# Patient Record
Sex: FEMALE | Race: WHITE | NOT HISPANIC OR LATINO | Employment: OTHER | URBAN - METROPOLITAN AREA
[De-identification: names, ages, dates, MRNs, and addresses within clinical notes are randomized per-mention and may not be internally consistent; named-entity substitution may affect disease eponyms.]

---

## 2017-07-13 PROBLEM — Z96.1: Noted: 2017-07-13

## 2018-01-08 ENCOUNTER — NEW PATIENT COMPREHENSIVE (OUTPATIENT)
Dept: URBAN - METROPOLITAN AREA CLINIC 43 | Facility: CLINIC | Age: 73
End: 2018-01-08

## 2018-01-08 VITALS
HEART RATE: 66 BPM | HEIGHT: 55 IN | RESPIRATION RATE: 14 BRPM | DIASTOLIC BLOOD PRESSURE: 86 MMHG | SYSTOLIC BLOOD PRESSURE: 155 MMHG

## 2018-01-08 DIAGNOSIS — H26.492: ICD-10-CM

## 2018-01-08 DIAGNOSIS — H40.013: ICD-10-CM

## 2018-01-08 DIAGNOSIS — H43.811: ICD-10-CM

## 2018-01-08 DIAGNOSIS — H25.811: ICD-10-CM

## 2018-01-08 PROCEDURE — 92134 CPTRZ OPH DX IMG PST SGM RTA: CPT

## 2018-01-08 PROCEDURE — 92015 DETERMINE REFRACTIVE STATE: CPT

## 2018-01-08 PROCEDURE — 92136TC INTERFEROMETRY - TECHNICAL COMPONENT

## 2018-01-08 PROCEDURE — G8418 CALC BMI BLW LOW PARAM F/U: HCPCS

## 2018-01-08 PROCEDURE — 99204 OFFICE O/P NEW MOD 45 MIN: CPT

## 2018-01-08 PROCEDURE — 4040F PNEUMOC VAC/ADMIN/RCVD: CPT

## 2018-01-08 PROCEDURE — G8952 PRE-HTN/HTN, NO F/U, NOT GVN: HCPCS

## 2018-01-08 PROCEDURE — 1036F TOBACCO NON-USER: CPT

## 2018-01-08 PROCEDURE — 92025-2 CORNEAL TOPOGRAPHY, PT

## 2018-01-08 PROCEDURE — G8427 DOCREV CUR MEDS BY ELIG CLIN: HCPCS

## 2018-01-08 RX ORDER — DUREZOL 0.5 MG/ML: 1 EMULSION OPHTHALMIC TWICE A DAY

## 2018-01-08 RX ORDER — NEPAFENAC 3 MG/ML: 1 SUSPENSION/ DROPS OPHTHALMIC ONCE A DAY

## 2018-01-08 RX ORDER — TIMOLOL MALEATE 6.8 MG/ML: 1 SOLUTION OPHTHALMIC ONCE A DAY

## 2018-01-08 RX ORDER — MOXIFLOXACIN HYDROCHLORIDE 5 MG/ML: 1 SOLUTION/ DROPS OPHTHALMIC

## 2018-01-08 ASSESSMENT — VISUAL ACUITY
OS_SC: J4
OD_SC: J10
OD_SC: 20/40-2
OD_GLARE: 20/70
OS_SC: 20/30-1

## 2018-01-08 ASSESSMENT — TONOMETRY
OS_IOP_MMHG: 17
OD_IOP_MMHG: 21
OD_IOP_MMHG: 22

## 2018-01-18 ENCOUNTER — SURGERY/PROCEDURE (OUTPATIENT)
Dept: URBAN - METROPOLITAN AREA SURGERY 14 | Facility: SURGERY | Age: 73
End: 2018-01-18

## 2018-01-18 DIAGNOSIS — H26.492: ICD-10-CM

## 2018-01-18 PROCEDURE — 66821 AFTER CATARACT LASER SURGERY: CPT

## 2018-01-19 ENCOUNTER — POST-OP (OUTPATIENT)
Dept: URBAN - METROPOLITAN AREA CLINIC 43 | Facility: CLINIC | Age: 73
End: 2018-01-19

## 2018-01-19 DIAGNOSIS — Z98.890: ICD-10-CM

## 2018-01-19 PROCEDURE — 99024 POSTOP FOLLOW-UP VISIT: CPT

## 2018-01-19 ASSESSMENT — VISUAL ACUITY
OS_SC: 20/30+2
OD_SC: 20/40

## 2018-01-19 ASSESSMENT — TONOMETRY
OD_IOP_MMHG: 21
OS_IOP_MMHG: 21

## 2018-05-29 ENCOUNTER — CONSULT (OUTPATIENT)
Dept: URBAN - METROPOLITAN AREA CLINIC 43 | Facility: CLINIC | Age: 73
End: 2018-05-29

## 2018-05-29 DIAGNOSIS — H25.811: ICD-10-CM

## 2018-05-29 DIAGNOSIS — H43.811: ICD-10-CM

## 2018-05-29 DIAGNOSIS — H40.1134: ICD-10-CM

## 2018-05-29 PROCEDURE — 1036F TOBACCO NON-USER: CPT

## 2018-05-29 PROCEDURE — 2027F OPTIC NERVE HEAD EVAL DONE: CPT

## 2018-05-29 PROCEDURE — G8427 DOCREV CUR MEDS BY ELIG CLIN: HCPCS

## 2018-05-29 PROCEDURE — 92014 COMPRE OPH EXAM EST PT 1/>: CPT

## 2018-05-29 PROCEDURE — 3284F IOP RED >=15% PRE-NTRV LVL: CPT

## 2018-05-29 PROCEDURE — 76514 ECHO EXAM OF EYE THICKNESS: CPT

## 2018-05-29 PROCEDURE — 9222550 BILAT EXTENDED OPHTHALMOSCOPY, FIRST

## 2018-05-29 PROCEDURE — G8785 BP SCRN NO PERF AT INTERVAL: HCPCS

## 2018-05-29 PROCEDURE — 92020 GONIOSCOPY: CPT

## 2018-05-29 ASSESSMENT — PACHYMETRY
OD_CT_UM: 527
OS_CT_UM: 532

## 2018-05-29 ASSESSMENT — VISUAL ACUITY
OD_SC: 20/40-2
OD_SC: J10
OS_SC: J3
OS_SC: 20/25-1

## 2018-05-29 ASSESSMENT — TONOMETRY
OS_IOP_MMHG: 18
OD_IOP_MMHG: 16

## 2018-07-05 ENCOUNTER — IOP CHECK (OUTPATIENT)
Dept: URBAN - METROPOLITAN AREA CLINIC 43 | Facility: CLINIC | Age: 73
End: 2018-07-05

## 2018-07-05 DIAGNOSIS — H40.1113: ICD-10-CM

## 2018-07-05 DIAGNOSIS — H40.1122: ICD-10-CM

## 2018-07-05 PROCEDURE — 1036F TOBACCO NON-USER: CPT

## 2018-07-05 PROCEDURE — 2027F OPTIC NERVE HEAD EVAL DONE: CPT

## 2018-07-05 PROCEDURE — G8427 DOCREV CUR MEDS BY ELIG CLIN: HCPCS

## 2018-07-05 PROCEDURE — 0517F GLAUCOMA PLAN OF CARE DOCD: CPT

## 2018-07-05 PROCEDURE — 3285F IOP DOWN <15% OF PRE-SVC LVL: CPT

## 2018-07-05 PROCEDURE — G8785 BP SCRN NO PERF AT INTERVAL: HCPCS

## 2018-07-05 PROCEDURE — 92012 INTRM OPH EXAM EST PATIENT: CPT

## 2018-07-05 PROCEDURE — 92083 EXTENDED VISUAL FIELD XM: CPT

## 2018-07-05 ASSESSMENT — TONOMETRY
OS_IOP_MMHG: 17
OD_IOP_MMHG: 16

## 2018-07-05 ASSESSMENT — VISUAL ACUITY
OS_SC: 20/25-2
OD_SC: 20/40+2

## 2018-08-02 ENCOUNTER — IOP CHECK (OUTPATIENT)
Dept: URBAN - METROPOLITAN AREA CLINIC 43 | Facility: CLINIC | Age: 73
End: 2018-08-02

## 2018-08-02 DIAGNOSIS — H40.1122: ICD-10-CM

## 2018-08-02 DIAGNOSIS — H40.1113: ICD-10-CM

## 2018-08-02 PROCEDURE — 3285F IOP DOWN <15% OF PRE-SVC LVL: CPT

## 2018-08-02 PROCEDURE — 0517F GLAUCOMA PLAN OF CARE DOCD: CPT

## 2018-08-02 PROCEDURE — G8428 CUR MEDS NOT DOCUMENT: HCPCS

## 2018-08-02 PROCEDURE — 1036F TOBACCO NON-USER: CPT

## 2018-08-02 PROCEDURE — G8785 BP SCRN NO PERF AT INTERVAL: HCPCS

## 2018-08-02 PROCEDURE — 2027F OPTIC NERVE HEAD EVAL DONE: CPT

## 2018-08-02 PROCEDURE — 92012 INTRM OPH EXAM EST PATIENT: CPT

## 2018-08-02 ASSESSMENT — VISUAL ACUITY
OD_SC: 20/40-3
OS_SC: 20/30-2

## 2018-08-02 ASSESSMENT — TONOMETRY
OS_IOP_MMHG: 12
OD_IOP_MMHG: 13

## 2018-11-15 ENCOUNTER — IOP CHECK (OUTPATIENT)
Dept: URBAN - METROPOLITAN AREA CLINIC 43 | Facility: CLINIC | Age: 73
End: 2018-11-15

## 2018-11-15 DIAGNOSIS — H40.1122: ICD-10-CM

## 2018-11-15 DIAGNOSIS — H40.1113: ICD-10-CM

## 2018-11-15 PROCEDURE — G8428 CUR MEDS NOT DOCUMENT: HCPCS

## 2018-11-15 PROCEDURE — 1036F TOBACCO NON-USER: CPT

## 2018-11-15 PROCEDURE — G8785 BP SCRN NO PERF AT INTERVAL: HCPCS

## 2018-11-15 PROCEDURE — 3284F IOP RED >=15% PRE-NTRV LVL: CPT

## 2018-11-15 PROCEDURE — 92012 INTRM OPH EXAM EST PATIENT: CPT

## 2018-11-15 PROCEDURE — 2027F OPTIC NERVE HEAD EVAL DONE: CPT

## 2018-11-15 PROCEDURE — G9903 PT SCRN TBCO ID AS NON USER: HCPCS

## 2018-11-15 ASSESSMENT — VISUAL ACUITY
OD_SC: 20/40-2
OS_SC: 20/30

## 2018-11-15 ASSESSMENT — TONOMETRY
OD_IOP_MMHG: 14
OS_IOP_MMHG: 13

## 2019-02-14 ENCOUNTER — IOP CHECK (OUTPATIENT)
Dept: URBAN - METROPOLITAN AREA CLINIC 43 | Facility: CLINIC | Age: 74
End: 2019-02-14

## 2019-02-14 DIAGNOSIS — H40.1113: ICD-10-CM

## 2019-02-14 DIAGNOSIS — H40.1122: ICD-10-CM

## 2019-02-14 PROCEDURE — 65855 TRABECULOPLASTY LASER SURG: CPT

## 2019-02-14 PROCEDURE — 92133 CPTRZD OPH DX IMG PST SGM ON: CPT

## 2019-02-14 PROCEDURE — 92012 INTRM OPH EXAM EST PATIENT: CPT

## 2019-02-14 RX ORDER — PREDNISOLONE ACETATE 10 MG/ML
1 SUSPENSION/ DROPS OPHTHALMIC
Start: 2019-02-14

## 2019-02-14 ASSESSMENT — TONOMETRY
OS_IOP_MMHG: 17
OD_IOP_MMHG: 16

## 2019-02-14 ASSESSMENT — VISUAL ACUITY
OD_SC: 20/40-1
OS_SC: 20/25-2

## 2019-02-26 ENCOUNTER — CATARACT CONSULT (OUTPATIENT)
Dept: URBAN - METROPOLITAN AREA CLINIC 43 | Facility: CLINIC | Age: 74
End: 2019-02-26

## 2019-02-26 VITALS
RESPIRATION RATE: 16 BRPM | HEART RATE: 61 BPM | HEIGHT: 55 IN | DIASTOLIC BLOOD PRESSURE: 93 MMHG | SYSTOLIC BLOOD PRESSURE: 171 MMHG

## 2019-02-26 DIAGNOSIS — H40.1113: ICD-10-CM

## 2019-02-26 DIAGNOSIS — H40.1122: ICD-10-CM

## 2019-02-26 DIAGNOSIS — H25.811: ICD-10-CM

## 2019-02-26 DIAGNOSIS — H18.51: ICD-10-CM

## 2019-02-26 DIAGNOSIS — H43.811: ICD-10-CM

## 2019-02-26 PROCEDURE — 92025-3 CORNEAL TOPO, REFUSED

## 2019-02-26 PROCEDURE — 9222650 BILAT EXTENDED OPHTHALMOSCOPY, F/U

## 2019-02-26 PROCEDURE — 92286 ANT SGM IMG I&R SPECLR MIC: CPT

## 2019-02-26 PROCEDURE — 92136TC INTERFEROMETRY - TECHNICAL COMPONENT

## 2019-02-26 PROCEDURE — V2799I IMPRIMIS

## 2019-02-26 PROCEDURE — 92014 COMPRE OPH EXAM EST PT 1/>: CPT

## 2019-02-26 PROCEDURE — 92250 FUNDUS PHOTOGRAPHY W/I&R: CPT

## 2019-02-26 ASSESSMENT — VISUAL ACUITY
OD_PAM: 20/25
OD_SC: J12
OD_SC: 20/50-2
OS_SC: J8
OD_PH: 20/40+1
OS_CC: J1
OD_BAT: 20/80
OD_CC: J2
OS_SC: 20/30-2

## 2019-02-26 ASSESSMENT — TONOMETRY
OS_IOP_MMHG: 17
OD_IOP_MMHG: 18

## 2019-03-04 ENCOUNTER — SURGERY/PROCEDURE (OUTPATIENT)
Dept: URBAN - METROPOLITAN AREA CLINIC 43 | Facility: CLINIC | Age: 74
End: 2019-03-04

## 2019-03-04 ENCOUNTER — PRE-OP/H&P (OUTPATIENT)
Dept: URBAN - METROPOLITAN AREA CLINIC 39 | Facility: CLINIC | Age: 74
End: 2019-03-04

## 2019-03-04 VITALS
HEIGHT: 55 IN | SYSTOLIC BLOOD PRESSURE: 171 MMHG | HEART RATE: 61 BPM | RESPIRATION RATE: 16 BRPM | DIASTOLIC BLOOD PRESSURE: 92 MMHG

## 2019-03-04 DIAGNOSIS — H25.811: ICD-10-CM

## 2019-03-04 DIAGNOSIS — H40.1113: ICD-10-CM

## 2019-03-04 PROCEDURE — 66984 XCAPSL CTRC RMVL W/O ECP: CPT

## 2019-03-04 PROCEDURE — 99211T TECH SERVICE

## 2019-03-04 PROCEDURE — 66174 TRLUML DIL AQ O/F CAN W/O ST: CPT

## 2019-03-04 PROCEDURE — 65820 GONIOTOMY: CPT

## 2019-03-05 ENCOUNTER — 1 DAY POST-OP (OUTPATIENT)
Dept: URBAN - METROPOLITAN AREA CLINIC 43 | Facility: CLINIC | Age: 74
End: 2019-03-05

## 2019-03-05 DIAGNOSIS — Z96.1: ICD-10-CM

## 2019-03-05 PROCEDURE — 99024 POSTOP FOLLOW-UP VISIT: CPT

## 2019-03-05 ASSESSMENT — TONOMETRY
OS_IOP_MMHG: 18
OD_IOP_MMHG: 17
OD_IOP_MMHG: 15

## 2019-03-05 ASSESSMENT — VISUAL ACUITY
OD_SC: 20/70-2
OS_SC: 20/30+2
OD_PH: 20/40-1

## 2019-03-20 ENCOUNTER — POST-OP CATARACT (OUTPATIENT)
Dept: URBAN - METROPOLITAN AREA CLINIC 43 | Facility: CLINIC | Age: 74
End: 2019-03-20

## 2019-03-20 DIAGNOSIS — Z96.1: ICD-10-CM

## 2019-03-20 PROCEDURE — 99024 POSTOP FOLLOW-UP VISIT: CPT

## 2019-03-20 ASSESSMENT — VISUAL ACUITY
OD_SC: J6
OD_SC: 20/50
OD_CC: J1
OS_SC: J6
OS_CC: J1
OS_SC: 20/30-2
OU_SC: J4

## 2019-03-20 ASSESSMENT — TONOMETRY
OD_IOP_MMHG: 18
OS_IOP_MMHG: 16

## 2019-04-16 ENCOUNTER — POST-OP CATARACT (OUTPATIENT)
Dept: URBAN - METROPOLITAN AREA CLINIC 43 | Facility: CLINIC | Age: 74
End: 2019-04-16

## 2019-04-16 DIAGNOSIS — Z96.1: ICD-10-CM

## 2019-04-16 PROCEDURE — 99024 POSTOP FOLLOW-UP VISIT: CPT

## 2019-04-16 ASSESSMENT — VISUAL ACUITY
OS_SC: 20/25-1
OD_CC: J1
OD_SC: J3
OS_CC: J1
OS_SC: J6
OD_SC: 20/50-1

## 2019-04-16 ASSESSMENT — TONOMETRY
OD_IOP_MMHG: 16
OS_IOP_MMHG: 16

## 2019-05-14 ENCOUNTER — POST-OP (OUTPATIENT)
Dept: URBAN - METROPOLITAN AREA CLINIC 43 | Facility: CLINIC | Age: 74
End: 2019-05-14

## 2019-05-14 DIAGNOSIS — Z96.1: ICD-10-CM

## 2019-05-14 PROCEDURE — 99024 POSTOP FOLLOW-UP VISIT: CPT

## 2019-05-14 ASSESSMENT — VISUAL ACUITY
OD_SC: 20/40+2
OD_PH: 20/30
OD_SC: J3
OS_SC: 20/25-2
OS_SC: J8-

## 2019-05-14 ASSESSMENT — TONOMETRY
OD_IOP_MMHG: 16
OS_IOP_MMHG: 16

## 2019-07-24 ENCOUNTER — REFRACTION ONLY (OUTPATIENT)
Dept: URBAN - METROPOLITAN AREA CLINIC 43 | Facility: CLINIC | Age: 74
End: 2019-07-24

## 2019-07-24 DIAGNOSIS — H04.123: ICD-10-CM

## 2019-07-24 PROCEDURE — 92015GRNC REFRACTION GLASSES RECHECK - NO CHARGE

## 2019-07-24 ASSESSMENT — TONOMETRY
OD_IOP_MMHG: 16
OS_IOP_MMHG: 16

## 2019-07-24 ASSESSMENT — VISUAL ACUITY
OS_SC: 20/20-2
OD_CC: J2
OD_SC: 20/60+2
OD_CC: 20/30+2
OS_SC: J8
OD_SC: J4-
OS_CC: J1
OS_CC: 20/20

## 2019-09-10 ENCOUNTER — ESTABLISHED COMPREHENSIVE EXAM (OUTPATIENT)
Dept: URBAN - METROPOLITAN AREA CLINIC 43 | Facility: CLINIC | Age: 74
End: 2019-09-10

## 2019-09-10 DIAGNOSIS — H40.1113: ICD-10-CM

## 2019-09-10 DIAGNOSIS — H40.1122: ICD-10-CM

## 2019-09-10 PROCEDURE — 92014 COMPRE OPH EXAM EST PT 1/>: CPT

## 2019-09-10 PROCEDURE — 9222650 BILAT EXTENDED OPHTHALMOSCOPY, F/U

## 2019-09-10 PROCEDURE — 92250 FUNDUS PHOTOGRAPHY W/I&R: CPT

## 2019-09-10 ASSESSMENT — TONOMETRY
OS_IOP_MMHG: 16
OD_IOP_MMHG: 16

## 2019-09-10 ASSESSMENT — VISUAL ACUITY
OS_SC: 20/25+2
OD_CC: J1
OS_CC: 20/20
OD_SC: J1
OD_SC: 20/40
OD_CC: 20/20-2
OS_SC: J3
OS_CC: J1

## 2019-10-10 ENCOUNTER — IOP CHECK (OUTPATIENT)
Dept: URBAN - METROPOLITAN AREA CLINIC 43 | Facility: CLINIC | Age: 74
End: 2019-10-10

## 2019-10-10 DIAGNOSIS — H40.1122: ICD-10-CM

## 2019-10-10 DIAGNOSIS — H40.1113: ICD-10-CM

## 2019-10-10 PROCEDURE — 92083 EXTENDED VISUAL FIELD XM: CPT

## 2019-10-10 PROCEDURE — 92012 INTRM OPH EXAM EST PATIENT: CPT

## 2019-10-10 RX ORDER — DUREZOL 0.5 MG/ML: 1 EMULSION OPHTHALMIC

## 2019-10-10 RX ORDER — MOXIFLOXACIN HYDROCHLORIDE 5 MG/ML: 1 SOLUTION/ DROPS OPHTHALMIC

## 2019-10-10 ASSESSMENT — VISUAL ACUITY
OS_CC: 20/20-1
OD_CC: 20/25+2

## 2019-10-10 ASSESSMENT — TONOMETRY
OS_IOP_MMHG: 16
OD_IOP_MMHG: 16

## 2020-01-06 ENCOUNTER — SURGERY/PROCEDURE (OUTPATIENT)
Dept: URBAN - METROPOLITAN AREA CLINIC 43 | Facility: CLINIC | Age: 75
End: 2020-01-06

## 2020-01-06 ENCOUNTER — PRE-OP/H&P (OUTPATIENT)
Dept: URBAN - METROPOLITAN AREA CLINIC 39 | Facility: CLINIC | Age: 75
End: 2020-01-06

## 2020-01-06 DIAGNOSIS — H40.1113: ICD-10-CM

## 2020-01-06 DIAGNOSIS — H40.1122: ICD-10-CM

## 2020-01-06 PROCEDURE — 0449T INSJ AQUEOUS DRAIN DEV 1ST: CPT

## 2020-01-06 PROCEDURE — 99211HP H&P OFFICE/OUTPATIENT VISIT, EST

## 2020-01-07 ENCOUNTER — 1 DAY POST-OP (OUTPATIENT)
Dept: URBAN - METROPOLITAN AREA CLINIC 43 | Facility: CLINIC | Age: 75
End: 2020-01-07

## 2020-01-07 DIAGNOSIS — H40.1113: ICD-10-CM

## 2020-01-07 DIAGNOSIS — H40.1122: ICD-10-CM

## 2020-01-07 PROCEDURE — 99024 POSTOP FOLLOW-UP VISIT: CPT

## 2020-01-07 ASSESSMENT — VISUAL ACUITY
OD_SC: 20/40+1
OS_SC: 20/25+2
OD_PH: 20/25-2

## 2020-01-07 ASSESSMENT — TONOMETRY
OD_IOP_MMHG: 08
OS_IOP_MMHG: 16

## 2020-01-14 ENCOUNTER — POST-OP (OUTPATIENT)
Dept: URBAN - METROPOLITAN AREA CLINIC 43 | Facility: CLINIC | Age: 75
End: 2020-01-14

## 2020-01-14 DIAGNOSIS — H40.1122: ICD-10-CM

## 2020-01-14 DIAGNOSIS — H40.1113: ICD-10-CM

## 2020-01-14 PROCEDURE — 99024 POSTOP FOLLOW-UP VISIT: CPT

## 2020-01-14 ASSESSMENT — VISUAL ACUITY
OD_CC: 20/25-2
OS_CC: 20/20

## 2020-01-14 ASSESSMENT — TONOMETRY
OS_IOP_MMHG: 16
OD_IOP_MMHG: 09

## 2020-01-30 ENCOUNTER — POST-OP (OUTPATIENT)
Dept: URBAN - METROPOLITAN AREA CLINIC 43 | Facility: CLINIC | Age: 75
End: 2020-01-30

## 2020-01-30 DIAGNOSIS — H40.1122: ICD-10-CM

## 2020-01-30 DIAGNOSIS — H40.1113: ICD-10-CM

## 2020-01-30 PROCEDURE — 66999PO NON CO-MANAGED OTHER SURGERY PO

## 2020-01-30 ASSESSMENT — VISUAL ACUITY
OD_CC: 20/25-2
OS_CC: 20/25-1

## 2020-01-30 ASSESSMENT — TONOMETRY
OD_IOP_MMHG: 11
OS_IOP_MMHG: 15

## 2020-02-13 ENCOUNTER — POST-OP (OUTPATIENT)
Dept: URBAN - METROPOLITAN AREA CLINIC 43 | Facility: CLINIC | Age: 75
End: 2020-02-13

## 2020-02-13 DIAGNOSIS — H40.1113: ICD-10-CM

## 2020-02-13 DIAGNOSIS — H40.1122: ICD-10-CM

## 2020-02-13 PROCEDURE — 99024 POSTOP FOLLOW-UP VISIT: CPT

## 2020-02-13 ASSESSMENT — TONOMETRY
OD_IOP_MMHG: 11
OS_IOP_MMHG: 14

## 2020-02-13 ASSESSMENT — VISUAL ACUITY
OS_CC: 20/20-1
OD_CC: 20/30-1

## 2020-03-10 ENCOUNTER — POST-OP (OUTPATIENT)
Dept: URBAN - METROPOLITAN AREA CLINIC 43 | Facility: CLINIC | Age: 75
End: 2020-03-10

## 2020-03-10 DIAGNOSIS — H40.1122: ICD-10-CM

## 2020-03-10 DIAGNOSIS — H40.1113: ICD-10-CM

## 2020-03-10 PROCEDURE — 99024 POSTOP FOLLOW-UP VISIT: CPT

## 2020-03-10 PROCEDURE — 66250 FOLLOW-UP SURGERY OF EYE: CPT

## 2020-03-10 ASSESSMENT — TONOMETRY
OS_IOP_MMHG: 18
OD_IOP_MMHG: 16

## 2020-03-10 ASSESSMENT — VISUAL ACUITY
OD_CC: 20/30-2
OS_CC: 20/20-1

## 2020-03-24 ENCOUNTER — POST-OP (OUTPATIENT)
Dept: URBAN - METROPOLITAN AREA CLINIC 43 | Facility: CLINIC | Age: 75
End: 2020-03-24

## 2020-03-24 DIAGNOSIS — H40.1122: ICD-10-CM

## 2020-03-24 DIAGNOSIS — H40.1113: ICD-10-CM

## 2020-03-24 PROCEDURE — 99024 POSTOP FOLLOW-UP VISIT: CPT

## 2020-03-24 ASSESSMENT — TONOMETRY
OD_IOP_MMHG: 14
OS_IOP_MMHG: 16

## 2020-03-24 ASSESSMENT — VISUAL ACUITY
OD_CC: 20/25-2
OS_CC: 20/25+2

## 2020-04-14 ENCOUNTER — POST-OP (OUTPATIENT)
Dept: URBAN - METROPOLITAN AREA CLINIC 43 | Facility: CLINIC | Age: 75
End: 2020-04-14

## 2020-04-14 DIAGNOSIS — H40.1122: ICD-10-CM

## 2020-04-14 DIAGNOSIS — H40.1113: ICD-10-CM

## 2020-04-14 PROCEDURE — 99024 POSTOP FOLLOW-UP VISIT: CPT

## 2020-04-14 ASSESSMENT — VISUAL ACUITY
OD_CC: J1
OD_CC: 20/25-1
OS_CC: 20/20-1
OS_CC: J1

## 2020-04-14 ASSESSMENT — TONOMETRY
OS_IOP_MMHG: 18
OD_IOP_MMHG: 15

## 2020-05-08 NOTE — PATIENT DISCUSSION
Patient presents w/ some dry eye and irritation issues involving both eyes. After care examination explained that there are no significant abnormalities involving either eye at this time. Discussed the blepharitis and dry eye diagnoses in detail with the patient today. Recommend AT drops as needed. Recommend warm compresses and lid scrubs w/ baby shampoo for blepharitis management. Follow up if symptoms worsen.

## 2020-05-28 ENCOUNTER — IOP CHECK (OUTPATIENT)
Dept: URBAN - METROPOLITAN AREA CLINIC 43 | Facility: CLINIC | Age: 75
End: 2020-05-28

## 2020-05-28 DIAGNOSIS — H40.1122: ICD-10-CM

## 2020-05-28 DIAGNOSIS — H40.1113: ICD-10-CM

## 2020-05-28 PROCEDURE — 66999PO NON CO-MANAGED OTHER SURGERY PO

## 2020-05-28 ASSESSMENT — TONOMETRY
OD_IOP_MMHG: 15
OS_IOP_MMHG: 14

## 2020-05-28 ASSESSMENT — VISUAL ACUITY
OD_CC: 20/30
OS_CC: 20/20-2

## 2020-06-05 NOTE — PATIENT DISCUSSION
Patient presents w/ a marginal chalazion involving the left lower eyelid. Explained this diagnosis in detail with the patient today. Recommend continued use of warm compresses 3-4 times daily. Also discussed the r/b of a steroid injection to the area today. Explained that due to the location of the chalazion an I &amp; D is not the most desirable treatment option at this time due to the risk of losing eyelashes. Patient expressed understanding and wishes to proceed with a kenalog injection today. Will follow up for kenalog injection if symptoms persist.  Prescribed Azasite ophthalmic drops (one drop bid to the left eye for one week).

## 2020-06-05 NOTE — PATIENT DISCUSSION
After informed consent the patient received 0.1 cc of Kenalog 10mg/ml to the left lower eyelid for management of a chalazion which was not amenable to incision and drainage

## 2020-06-25 ENCOUNTER — IOP CHECK (OUTPATIENT)
Dept: URBAN - METROPOLITAN AREA CLINIC 43 | Facility: CLINIC | Age: 75
End: 2020-06-25

## 2020-06-25 DIAGNOSIS — H40.1113: ICD-10-CM

## 2020-06-25 DIAGNOSIS — H04.123: ICD-10-CM

## 2020-06-25 DIAGNOSIS — H18.51: ICD-10-CM

## 2020-06-25 DIAGNOSIS — H40.1122: ICD-10-CM

## 2020-06-25 PROCEDURE — 92012 INTRM OPH EXAM EST PATIENT: CPT

## 2020-06-25 ASSESSMENT — VISUAL ACUITY
OS_CC: 20/20-2
OD_CC: 20/30-2

## 2020-06-25 ASSESSMENT — TONOMETRY
OS_IOP_MMHG: 18
OD_IOP_MMHG: 20

## 2020-07-28 ENCOUNTER — IOP CHECK (OUTPATIENT)
Dept: URBAN - METROPOLITAN AREA CLINIC 43 | Facility: CLINIC | Age: 75
End: 2020-07-28

## 2020-07-28 DIAGNOSIS — H40.1122: ICD-10-CM

## 2020-07-28 DIAGNOSIS — H40.1113: ICD-10-CM

## 2020-07-28 PROCEDURE — 66250 FOLLOW-UP SURGERY OF EYE: CPT

## 2020-07-28 PROCEDURE — 92012 INTRM OPH EXAM EST PATIENT: CPT

## 2020-07-28 RX ORDER — MOXIFLOXACIN HYDROCHLORIDE 5 MG/ML: 1 SOLUTION/ DROPS OPHTHALMIC

## 2020-07-28 ASSESSMENT — VISUAL ACUITY
OS_CC: 20/20-2
OD_CC: 20/40-2
OD_PH: 20/30

## 2020-07-28 ASSESSMENT — TONOMETRY
OD_IOP_MMHG: 15
OS_IOP_MMHG: 14
OD_IOP_MMHG: 20

## 2020-08-04 ENCOUNTER — POST-OP (OUTPATIENT)
Dept: URBAN - METROPOLITAN AREA CLINIC 43 | Facility: CLINIC | Age: 75
End: 2020-08-04

## 2020-08-04 DIAGNOSIS — H40.1122: ICD-10-CM

## 2020-08-04 DIAGNOSIS — H40.1113: ICD-10-CM

## 2020-08-04 PROCEDURE — 66999PO NON CO-MANAGED OTHER SURGERY PO

## 2020-08-04 ASSESSMENT — VISUAL ACUITY
OS_CC: 20/20-2
OD_CC: 20/30-2

## 2020-08-04 ASSESSMENT — TONOMETRY
OD_IOP_MMHG: 15
OS_IOP_MMHG: 14

## 2020-08-18 ENCOUNTER — POST-OP (OUTPATIENT)
Dept: URBAN - METROPOLITAN AREA CLINIC 43 | Facility: CLINIC | Age: 75
End: 2020-08-18

## 2020-08-18 DIAGNOSIS — H40.1122: ICD-10-CM

## 2020-08-18 DIAGNOSIS — H40.1113: ICD-10-CM

## 2020-08-18 PROCEDURE — 66999PO NON CO-MANAGED OTHER SURGERY PO

## 2020-08-18 ASSESSMENT — TONOMETRY
OS_IOP_MMHG: 18
OD_IOP_MMHG: 24

## 2020-08-18 ASSESSMENT — VISUAL ACUITY
OS_CC: 20/20-2
OD_CC: 20/40+1

## 2020-09-08 ENCOUNTER — POST-OP (OUTPATIENT)
Dept: URBAN - METROPOLITAN AREA CLINIC 43 | Facility: CLINIC | Age: 75
End: 2020-09-08

## 2020-09-08 DIAGNOSIS — H40.1122: ICD-10-CM

## 2020-09-08 DIAGNOSIS — H40.1113: ICD-10-CM

## 2020-09-08 PROCEDURE — 66999PO NON CO-MANAGED OTHER SURGERY PO

## 2020-09-08 ASSESSMENT — TONOMETRY
OD_IOP_MMHG: 18
OS_IOP_MMHG: 10

## 2020-09-08 ASSESSMENT — VISUAL ACUITY
OD_CC: 20/40-2
OS_CC: 20/20-2

## 2020-10-06 ENCOUNTER — POST-OP (OUTPATIENT)
Dept: URBAN - METROPOLITAN AREA CLINIC 43 | Facility: CLINIC | Age: 75
End: 2020-10-06

## 2020-10-06 DIAGNOSIS — H40.1113: ICD-10-CM

## 2020-10-06 PROCEDURE — 66999PO NON CO-MANAGED OTHER SURGERY PO

## 2020-10-06 RX ORDER — MOXIFLOXACIN HYDROCHLORIDE 5 MG/ML: 1 SOLUTION/ DROPS OPHTHALMIC

## 2020-10-06 ASSESSMENT — TONOMETRY
OD_IOP_MMHG: 31
OS_IOP_MMHG: 19

## 2020-10-06 ASSESSMENT — VISUAL ACUITY
OD_CC: 20/40+2
OS_CC: 20/20-2

## 2020-10-12 ENCOUNTER — SURGERY/PROCEDURE (OUTPATIENT)
Dept: URBAN - METROPOLITAN AREA CLINIC 43 | Facility: CLINIC | Age: 75
End: 2020-10-12

## 2020-10-12 ENCOUNTER — PRE-OP/H&P (OUTPATIENT)
Dept: URBAN - METROPOLITAN AREA SURGERY 14 | Facility: SURGERY | Age: 75
End: 2020-10-12

## 2020-10-12 DIAGNOSIS — H40.1113: ICD-10-CM

## 2020-10-12 DIAGNOSIS — H40.1122: ICD-10-CM

## 2020-10-12 PROCEDURE — 65920 REMOVE IMPLANT OF EYE: CPT

## 2020-10-12 PROCEDURE — 66250 FOLLOW-UP SURGERY OF EYE: CPT

## 2020-10-12 PROCEDURE — 99211HP H&P OFFICE/OUTPATIENT VISIT, EST

## 2020-10-12 PROCEDURE — 66172 INCISION OF EYE: CPT

## 2020-10-13 ENCOUNTER — 1 DAY POST-OP (OUTPATIENT)
Dept: URBAN - METROPOLITAN AREA CLINIC 43 | Facility: CLINIC | Age: 75
End: 2020-10-13

## 2020-10-13 DIAGNOSIS — H40.1122: ICD-10-CM

## 2020-10-13 DIAGNOSIS — H40.1113: ICD-10-CM

## 2020-10-13 PROCEDURE — 66999PO NON CO-MANAGED OTHER SURGERY PO

## 2020-10-13 ASSESSMENT — TONOMETRY
OS_IOP_MMHG: 20
OD_IOP_MMHG: 26

## 2020-10-13 ASSESSMENT — VISUAL ACUITY
OD_PH: 20/40+1
OD_CC: 20/60-1
OS_CC: 20/20

## 2020-10-20 ENCOUNTER — POST-OP (OUTPATIENT)
Dept: URBAN - METROPOLITAN AREA CLINIC 43 | Facility: CLINIC | Age: 75
End: 2020-10-20

## 2020-10-20 DIAGNOSIS — H40.1122: ICD-10-CM

## 2020-10-20 DIAGNOSIS — H40.1113: ICD-10-CM

## 2020-10-20 PROCEDURE — 66999PO NON CO-MANAGED OTHER SURGERY PO

## 2020-10-20 ASSESSMENT — VISUAL ACUITY
OS_CC: 20/20
OD_CC: 20/70
OD_PH: 20/60

## 2020-10-20 ASSESSMENT — TONOMETRY
OD_IOP_MMHG: 20
OD_IOP_MMHG: 28
OS_IOP_MMHG: 15

## 2020-11-05 ENCOUNTER — POST-OP (OUTPATIENT)
Dept: URBAN - METROPOLITAN AREA CLINIC 43 | Facility: CLINIC | Age: 75
End: 2020-11-05

## 2020-11-05 DIAGNOSIS — H40.1122: ICD-10-CM

## 2020-11-05 DIAGNOSIS — H40.1113: ICD-10-CM

## 2020-11-05 PROCEDURE — 66999PO NON CO-MANAGED OTHER SURGERY PO

## 2020-11-05 ASSESSMENT — VISUAL ACUITY: OS_CC: 20/20-3

## 2020-11-05 ASSESSMENT — TONOMETRY
OD_IOP_MMHG: 32
OS_IOP_MMHG: 16

## 2020-11-19 ENCOUNTER — POST-OP (OUTPATIENT)
Dept: URBAN - METROPOLITAN AREA CLINIC 43 | Facility: CLINIC | Age: 75
End: 2020-11-19

## 2020-11-19 DIAGNOSIS — H40.1122: ICD-10-CM

## 2020-11-19 DIAGNOSIS — H40.1113: ICD-10-CM

## 2020-11-19 PROCEDURE — 66999PO NON CO-MANAGED OTHER SURGERY PO

## 2020-11-19 ASSESSMENT — VISUAL ACUITY: OS_CC: 20/25

## 2020-11-19 ASSESSMENT — TONOMETRY
OS_IOP_MMHG: 16
OD_IOP_MMHG: 07

## 2020-12-17 ENCOUNTER — POST-OP (OUTPATIENT)
Dept: URBAN - METROPOLITAN AREA CLINIC 43 | Facility: CLINIC | Age: 75
End: 2020-12-17

## 2020-12-17 DIAGNOSIS — H40.1122: ICD-10-CM

## 2020-12-17 DIAGNOSIS — H40.1113: ICD-10-CM

## 2020-12-17 PROCEDURE — 66999PO NON CO-MANAGED OTHER SURGERY PO

## 2020-12-17 ASSESSMENT — TONOMETRY
OD_IOP_MMHG: 08
OS_IOP_MMHG: 15

## 2020-12-17 ASSESSMENT — VISUAL ACUITY
OS_CC: 20/20-1
OD_CC: 20/40-1

## 2021-01-14 ENCOUNTER — IOP CHECK (OUTPATIENT)
Dept: URBAN - METROPOLITAN AREA CLINIC 43 | Facility: CLINIC | Age: 76
End: 2021-01-14

## 2021-01-14 DIAGNOSIS — H40.1122: ICD-10-CM

## 2021-01-14 DIAGNOSIS — H40.1113: ICD-10-CM

## 2021-01-14 PROCEDURE — 92012 INTRM OPH EXAM EST PATIENT: CPT

## 2021-01-14 ASSESSMENT — VISUAL ACUITY: OS_CC: 20/20-1

## 2021-01-14 ASSESSMENT — TONOMETRY
OS_IOP_MMHG: 14
OD_IOP_MMHG: 02

## 2021-02-09 ENCOUNTER — IOP CHECK (OUTPATIENT)
Dept: URBAN - METROPOLITAN AREA CLINIC 43 | Facility: CLINIC | Age: 76
End: 2021-02-09

## 2021-02-09 DIAGNOSIS — H40.1122: ICD-10-CM

## 2021-02-09 DIAGNOSIS — H40.1113: ICD-10-CM

## 2021-02-09 PROCEDURE — 92012 INTRM OPH EXAM EST PATIENT: CPT

## 2021-02-09 RX ORDER — CYCLOPENTOLATE HYDROCHLORIDE 10 MG/ML: 1 SOLUTION/ DROPS OPHTHALMIC TWICE A DAY

## 2021-02-09 ASSESSMENT — TONOMETRY
OS_IOP_MMHG: 12
OD_IOP_MMHG: 03

## 2021-02-09 ASSESSMENT — VISUAL ACUITY
OS_CC: 20/20-1
OD_CC: 20/60-1

## 2021-03-09 ENCOUNTER — IOP CHECK (OUTPATIENT)
Dept: URBAN - METROPOLITAN AREA CLINIC 43 | Facility: CLINIC | Age: 76
End: 2021-03-09

## 2021-03-09 DIAGNOSIS — H04.123: ICD-10-CM

## 2021-03-09 DIAGNOSIS — H18.513: ICD-10-CM

## 2021-03-09 DIAGNOSIS — H40.1122: ICD-10-CM

## 2021-03-09 DIAGNOSIS — H40.1113: ICD-10-CM

## 2021-03-09 PROCEDURE — 92012 INTRM OPH EXAM EST PATIENT: CPT

## 2021-03-09 ASSESSMENT — TONOMETRY
OS_IOP_MMHG: 10
OD_IOP_MMHG: 01

## 2021-03-09 ASSESSMENT — VISUAL ACUITY
OD_PH: 20/50
OD_CC: 20/60+1
OS_CC: 20/20-1

## 2021-03-30 ENCOUNTER — IOP CHECK (OUTPATIENT)
Dept: URBAN - METROPOLITAN AREA CLINIC 43 | Facility: CLINIC | Age: 76
End: 2021-03-30

## 2021-03-30 DIAGNOSIS — H40.1113: ICD-10-CM

## 2021-03-30 DIAGNOSIS — H40.1122: ICD-10-CM

## 2021-03-30 PROCEDURE — 92012 INTRM OPH EXAM EST PATIENT: CPT

## 2021-03-30 ASSESSMENT — TONOMETRY
OD_IOP_MMHG: 02
OS_IOP_MMHG: 10

## 2021-03-30 ASSESSMENT — VISUAL ACUITY
OD_CC: 20/70-1
OD_PH: 20/50-2
OS_CC: 20/20-1

## 2021-04-29 ENCOUNTER — IOP CHECK (OUTPATIENT)
Dept: URBAN - METROPOLITAN AREA CLINIC 43 | Facility: CLINIC | Age: 76
End: 2021-04-29

## 2021-04-29 DIAGNOSIS — H40.1122: ICD-10-CM

## 2021-04-29 DIAGNOSIS — H40.1113: ICD-10-CM

## 2021-04-29 DIAGNOSIS — H04.123: ICD-10-CM

## 2021-04-29 DIAGNOSIS — H18.513: ICD-10-CM

## 2021-04-29 PROCEDURE — 92012 INTRM OPH EXAM EST PATIENT: CPT

## 2021-04-29 ASSESSMENT — TONOMETRY
OD_IOP_MMHG: 01
OS_IOP_MMHG: 08

## 2021-04-29 ASSESSMENT — VISUAL ACUITY
OD_CC: 20/70
OS_CC: 20/20-2

## 2021-06-08 ENCOUNTER — IOP CHECK (OUTPATIENT)
Dept: URBAN - METROPOLITAN AREA CLINIC 43 | Facility: CLINIC | Age: 76
End: 2021-06-08

## 2021-06-08 DIAGNOSIS — H40.1122: ICD-10-CM

## 2021-06-08 DIAGNOSIS — H40.1113: ICD-10-CM

## 2021-06-08 PROCEDURE — 92012 INTRM OPH EXAM EST PATIENT: CPT

## 2021-06-08 ASSESSMENT — TONOMETRY
OS_IOP_MMHG: 12
OD_IOP_MMHG: 01

## 2021-06-08 ASSESSMENT — VISUAL ACUITY
OD_CC: 20/70-1
OS_CC: 20/25-1

## 2021-07-13 ENCOUNTER — IOP CHECK (OUTPATIENT)
Dept: URBAN - METROPOLITAN AREA CLINIC 43 | Facility: CLINIC | Age: 76
End: 2021-07-13

## 2021-07-13 DIAGNOSIS — H40.1113: ICD-10-CM

## 2021-07-13 DIAGNOSIS — H40.1122: ICD-10-CM

## 2021-07-13 PROCEDURE — 92012 INTRM OPH EXAM EST PATIENT: CPT

## 2021-07-13 ASSESSMENT — VISUAL ACUITY
OD_CC: 20/70-1
OD_PH: 20/60-2
OS_CC: 20/20-2

## 2021-07-13 ASSESSMENT — TONOMETRY
OD_IOP_MMHG: 0
OS_IOP_MMHG: 12

## 2021-08-09 ENCOUNTER — SURGERY/PROCEDURE (OUTPATIENT)
Dept: URBAN - METROPOLITAN AREA CLINIC 43 | Facility: CLINIC | Age: 76
End: 2021-08-09

## 2021-08-09 ENCOUNTER — PRE-OP/H&P (OUTPATIENT)
Dept: URBAN - METROPOLITAN AREA SURGERY 14 | Facility: SURGERY | Age: 76
End: 2021-08-09

## 2021-08-09 DIAGNOSIS — H44.441: ICD-10-CM

## 2021-08-09 DIAGNOSIS — H40.1113: ICD-10-CM

## 2021-08-09 DIAGNOSIS — H40.1122: ICD-10-CM

## 2021-08-09 PROCEDURE — 99211HP H&P OFFICE/OUTPATIENT VISIT, EST

## 2021-08-09 PROCEDURE — 66250 FOLLOW-UP SURGERY OF EYE: CPT

## 2021-08-10 ENCOUNTER — 1 DAY POST-OP (OUTPATIENT)
Dept: URBAN - METROPOLITAN AREA CLINIC 43 | Facility: CLINIC | Age: 76
End: 2021-08-10

## 2021-08-10 DIAGNOSIS — H40.1113: ICD-10-CM

## 2021-08-10 DIAGNOSIS — H40.1122: ICD-10-CM

## 2021-08-10 DIAGNOSIS — H44.441: ICD-10-CM

## 2021-08-10 PROCEDURE — 66999PO NON CO-MANAGED OTHER SURGERY PO

## 2021-08-10 ASSESSMENT — VISUAL ACUITY
OS_CC: 20/20-1
OD_CC: 20/70-1

## 2021-08-10 ASSESSMENT — TONOMETRY
OS_IOP_MMHG: 14
OD_IOP_MMHG: 20

## 2021-08-19 ENCOUNTER — POST-OP (OUTPATIENT)
Dept: URBAN - METROPOLITAN AREA CLINIC 43 | Facility: CLINIC | Age: 76
End: 2021-08-19

## 2021-08-19 DIAGNOSIS — H40.1122: ICD-10-CM

## 2021-08-19 DIAGNOSIS — H40.1113: ICD-10-CM

## 2021-08-19 DIAGNOSIS — H44.441: ICD-10-CM

## 2021-08-19 PROCEDURE — 66999PO NON CO-MANAGED OTHER SURGERY PO

## 2021-08-19 ASSESSMENT — VISUAL ACUITY
OD_CC: 20/60-1
OS_CC: 20/20-2

## 2021-08-19 ASSESSMENT — TONOMETRY
OS_IOP_MMHG: 12
OD_IOP_MMHG: 20

## 2021-09-23 ENCOUNTER — POST-OP (OUTPATIENT)
Dept: URBAN - METROPOLITAN AREA CLINIC 43 | Facility: CLINIC | Age: 76
End: 2021-09-23

## 2021-09-23 DIAGNOSIS — H04.123: ICD-10-CM

## 2021-09-23 DIAGNOSIS — H40.1122: ICD-10-CM

## 2021-09-23 DIAGNOSIS — H40.1113: ICD-10-CM

## 2021-09-23 PROCEDURE — 66999PO NON CO-MANAGED OTHER SURGERY PO

## 2021-09-23 ASSESSMENT — TONOMETRY
OS_IOP_MMHG: 12
OD_IOP_MMHG: 17

## 2021-09-23 ASSESSMENT — VISUAL ACUITY
OS_CC: 20/20
OD_CC: 20/70

## 2021-10-28 ENCOUNTER — POST-OP (OUTPATIENT)
Dept: URBAN - METROPOLITAN AREA CLINIC 43 | Facility: CLINIC | Age: 76
End: 2021-10-28

## 2021-10-28 DIAGNOSIS — H44.441: ICD-10-CM

## 2021-10-28 DIAGNOSIS — H40.1122: ICD-10-CM

## 2021-10-28 DIAGNOSIS — H40.1113: ICD-10-CM

## 2021-10-28 PROCEDURE — 66999PO NON CO-MANAGED OTHER SURGERY PO

## 2021-10-28 ASSESSMENT — TONOMETRY
OS_IOP_MMHG: 14
OD_IOP_MMHG: 16

## 2021-10-28 ASSESSMENT — VISUAL ACUITY
OD_CC: 20/70
OS_CC: 20/20-1

## 2022-01-27 ENCOUNTER — FOLLOW UP (OUTPATIENT)
Dept: URBAN - METROPOLITAN AREA CLINIC 43 | Facility: CLINIC | Age: 77
End: 2022-01-27

## 2022-01-27 DIAGNOSIS — H40.1113: ICD-10-CM

## 2022-01-27 DIAGNOSIS — H40.1122: ICD-10-CM

## 2022-01-27 PROCEDURE — 92012 INTRM OPH EXAM EST PATIENT: CPT

## 2022-01-27 ASSESSMENT — TONOMETRY
OS_IOP_MMHG: 12
OD_IOP_MMHG: 15

## 2022-01-27 ASSESSMENT — VISUAL ACUITY
OD_CC: 20/70-2
OS_CC: 20/20-2

## 2022-02-10 ENCOUNTER — FOLLOW UP (OUTPATIENT)
Dept: URBAN - METROPOLITAN AREA CLINIC 43 | Facility: CLINIC | Age: 77
End: 2022-02-10

## 2022-02-10 DIAGNOSIS — H40.1113: ICD-10-CM

## 2022-02-10 DIAGNOSIS — H40.1122: ICD-10-CM

## 2022-02-10 PROCEDURE — 92012 INTRM OPH EXAM EST PATIENT: CPT

## 2022-02-10 ASSESSMENT — VISUAL ACUITY
OD_CC: 20/50+1
OS_CC: 20/25+2

## 2022-02-10 ASSESSMENT — TONOMETRY
OD_IOP_MMHG: 19
OS_IOP_MMHG: 18

## 2022-03-01 ENCOUNTER — POST-OP (OUTPATIENT)
Dept: URBAN - METROPOLITAN AREA CLINIC 43 | Facility: CLINIC | Age: 77
End: 2022-03-01

## 2022-03-01 DIAGNOSIS — H04.123: ICD-10-CM

## 2022-03-01 DIAGNOSIS — H18.513: ICD-10-CM

## 2022-03-01 DIAGNOSIS — H40.1122: ICD-10-CM

## 2022-03-01 DIAGNOSIS — H40.1113: ICD-10-CM

## 2022-03-01 PROCEDURE — 92012 INTRM OPH EXAM EST PATIENT: CPT

## 2022-03-01 ASSESSMENT — TONOMETRY
OS_IOP_MMHG: 14
OD_IOP_MMHG: 13

## 2022-03-01 ASSESSMENT — VISUAL ACUITY
OD_CC: 20/50-1
OS_CC: 20/25-3

## 2022-06-23 ENCOUNTER — COMPREHENSIVE EXAM (OUTPATIENT)
Dept: URBAN - METROPOLITAN AREA CLINIC 43 | Facility: CLINIC | Age: 77
End: 2022-06-23

## 2022-06-23 DIAGNOSIS — H40.1113: ICD-10-CM

## 2022-06-23 DIAGNOSIS — H44.441: ICD-10-CM

## 2022-06-23 DIAGNOSIS — H40.1122: ICD-10-CM

## 2022-06-23 PROCEDURE — 92250 FUNDUS PHOTOGRAPHY W/I&R: CPT

## 2022-06-23 PROCEDURE — 92014 COMPRE OPH EXAM EST PT 1/>: CPT

## 2022-06-23 ASSESSMENT — VISUAL ACUITY
OS_SC: J6
OD_CC: 20/60+1
OD_SC: 20/70-1
OD_SC: <J12
OS_CC: 20/25-1
OS_CC: J1
OD_CC: J12
OS_SC: 20/30-2

## 2022-06-23 ASSESSMENT — TONOMETRY
OS_IOP_MMHG: 13
OD_IOP_MMHG: 12

## 2022-08-19 NOTE — PATIENT DISCUSSION
based on hx - diagnosed in [place] 15 years ago. Asymptomatic. RNFL and pachy done today. Gonio at f/u. VF 24-2 prior.

## 2022-10-20 ENCOUNTER — ESTABLISHED PATIENT (OUTPATIENT)
Dept: URBAN - METROPOLITAN AREA CLINIC 43 | Facility: CLINIC | Age: 77
End: 2022-10-20

## 2022-10-20 DIAGNOSIS — H40.1122: ICD-10-CM

## 2022-10-20 DIAGNOSIS — H40.1113: ICD-10-CM

## 2022-10-20 DIAGNOSIS — H44.441: ICD-10-CM

## 2022-10-20 PROCEDURE — 92133 CPTRZD OPH DX IMG PST SGM ON: CPT

## 2022-10-20 PROCEDURE — 92012 INTRM OPH EXAM EST PATIENT: CPT

## 2022-10-20 ASSESSMENT — TONOMETRY
OD_IOP_MMHG: 13
OS_IOP_MMHG: 12

## 2022-10-20 ASSESSMENT — VISUAL ACUITY
OS_CC: 20/20
OD_CC: 20/60-1

## 2023-01-25 ENCOUNTER — ESTABLISHED PATIENT (OUTPATIENT)
Dept: URBAN - METROPOLITAN AREA CLINIC 43 | Facility: CLINIC | Age: 78
End: 2023-01-25

## 2023-01-25 DIAGNOSIS — H40.1113: ICD-10-CM

## 2023-01-25 DIAGNOSIS — H44.441: ICD-10-CM

## 2023-01-25 DIAGNOSIS — H40.1122: ICD-10-CM

## 2023-01-25 PROCEDURE — 92083 EXTENDED VISUAL FIELD XM: CPT

## 2023-01-25 PROCEDURE — 92012 INTRM OPH EXAM EST PATIENT: CPT

## 2023-01-25 ASSESSMENT — VISUAL ACUITY
OD_CC: 20/60-1
OS_CC: 20/20-1

## 2023-01-25 ASSESSMENT — TONOMETRY
OD_IOP_MMHG: 11
OS_IOP_MMHG: 10

## 2023-07-07 ENCOUNTER — ESTABLISHED PATIENT (OUTPATIENT)
Dept: URBAN - METROPOLITAN AREA CLINIC 43 | Facility: CLINIC | Age: 78
End: 2023-07-07

## 2023-07-07 DIAGNOSIS — H40.1122: ICD-10-CM

## 2023-07-07 DIAGNOSIS — H40.1113: ICD-10-CM

## 2023-07-07 DIAGNOSIS — H04.123: ICD-10-CM

## 2023-07-07 PROCEDURE — 92012 INTRM OPH EXAM EST PATIENT: CPT

## 2023-07-07 ASSESSMENT — TONOMETRY
OD_IOP_MMHG: 5
OS_IOP_MMHG: 15

## 2023-07-07 ASSESSMENT — VISUAL ACUITY
OS_CC: 20/20
OD_CC: 20/80-1

## 2023-10-05 ENCOUNTER — COMPREHENSIVE EXAM (OUTPATIENT)
Dept: URBAN - METROPOLITAN AREA CLINIC 43 | Facility: CLINIC | Age: 78
End: 2023-10-05

## 2023-10-05 DIAGNOSIS — H40.1113: ICD-10-CM

## 2023-10-05 DIAGNOSIS — H40.1122: ICD-10-CM

## 2023-10-05 DIAGNOSIS — Z96.1: ICD-10-CM

## 2023-10-05 DIAGNOSIS — H04.123: ICD-10-CM

## 2023-10-05 PROCEDURE — 92014 COMPRE OPH EXAM EST PT 1/>: CPT

## 2023-10-05 PROCEDURE — 92015 DETERMINE REFRACTIVE STATE: CPT

## 2023-10-05 PROCEDURE — 92133 CPTRZD OPH DX IMG PST SGM ON: CPT

## 2023-10-05 ASSESSMENT — VISUAL ACUITY
OS_SC: 20/20-1
OD_CC: J12
OS_CC: 20/20-2
OD_CC: 20/80+1
OS_SC: J4
OD_SC: 20/100-1
OS_CC: J1

## 2023-10-05 ASSESSMENT — TONOMETRY
OS_IOP_MMHG: 14
OD_IOP_MMHG: 5

## 2023-12-12 ENCOUNTER — CONSULTATION/EVALUATION (OUTPATIENT)
Dept: URBAN - METROPOLITAN AREA CLINIC 43 | Facility: CLINIC | Age: 78
End: 2023-12-12

## 2023-12-12 DIAGNOSIS — H04.123: ICD-10-CM

## 2023-12-12 DIAGNOSIS — H40.1113: ICD-10-CM

## 2023-12-12 DIAGNOSIS — H40.1122: ICD-10-CM

## 2023-12-12 PROCEDURE — 92250 FUNDUS PHOTOGRAPHY W/I&R: CPT

## 2023-12-12 PROCEDURE — 92014 COMPRE OPH EXAM EST PT 1/>: CPT

## 2023-12-12 RX ORDER — MOXIFLOXACIN OPHTHALMIC 5 MG/ML
1 SOLUTION/ DROPS OPHTHALMIC
Start: 2023-12-12

## 2023-12-12 ASSESSMENT — VISUAL ACUITY: OS_CC: 20/25

## 2023-12-12 ASSESSMENT — TONOMETRY
OD_IOP_MMHG: 05
OS_IOP_MMHG: 12

## 2023-12-21 ENCOUNTER — FOLLOW UP (OUTPATIENT)
Dept: URBAN - METROPOLITAN AREA CLINIC 43 | Facility: CLINIC | Age: 78
End: 2023-12-21

## 2023-12-21 DIAGNOSIS — H40.1113: ICD-10-CM

## 2023-12-21 DIAGNOSIS — H40.1122: ICD-10-CM

## 2023-12-21 PROCEDURE — 92012 INTRM OPH EXAM EST PATIENT: CPT

## 2023-12-21 ASSESSMENT — TONOMETRY
OD_IOP_MMHG: 04
OS_IOP_MMHG: 14

## 2023-12-21 ASSESSMENT — VISUAL ACUITY: OS_CC: 20/20

## 2024-02-19 ENCOUNTER — FOLLOW UP (OUTPATIENT)
Dept: URBAN - METROPOLITAN AREA CLINIC 43 | Facility: CLINIC | Age: 79
End: 2024-02-19

## 2024-02-19 DIAGNOSIS — H40.1122: ICD-10-CM

## 2024-02-19 DIAGNOSIS — H40.1113: ICD-10-CM

## 2024-02-19 DIAGNOSIS — H04.123: ICD-10-CM

## 2024-02-19 PROCEDURE — 92083 EXTENDED VISUAL FIELD XM: CPT

## 2024-02-19 PROCEDURE — 92012 INTRM OPH EXAM EST PATIENT: CPT

## 2024-02-19 ASSESSMENT — TONOMETRY
OS_IOP_MMHG: 14
OD_IOP_MMHG: 04

## 2024-02-19 ASSESSMENT — VISUAL ACUITY: OS_CC: 20/20-2

## 2024-05-20 ENCOUNTER — FOLLOW UP (OUTPATIENT)
Dept: URBAN - METROPOLITAN AREA CLINIC 43 | Facility: CLINIC | Age: 79
End: 2024-05-20

## 2024-05-20 DIAGNOSIS — H40.1122: ICD-10-CM

## 2024-05-20 DIAGNOSIS — H40.1113: ICD-10-CM

## 2024-05-20 DIAGNOSIS — H04.123: ICD-10-CM

## 2024-05-20 PROCEDURE — 99213 OFFICE O/P EST LOW 20 MIN: CPT

## 2024-05-20 ASSESSMENT — TONOMETRY
OD_IOP_MMHG: 4
OS_IOP_MMHG: 14

## 2024-05-20 ASSESSMENT — VISUAL ACUITY
OD_CC: 20/60-2
OS_CC: 20/20-1

## 2024-09-19 ENCOUNTER — COMPREHENSIVE EXAM (OUTPATIENT)
Dept: URBAN - METROPOLITAN AREA CLINIC 43 | Facility: CLINIC | Age: 79
End: 2024-09-19

## 2024-09-19 DIAGNOSIS — H40.1113: ICD-10-CM

## 2024-09-19 DIAGNOSIS — H04.123: ICD-10-CM

## 2024-09-19 DIAGNOSIS — Z96.1: ICD-10-CM

## 2024-09-19 DIAGNOSIS — H40.1122: ICD-10-CM

## 2024-09-19 PROCEDURE — 92015 DETERMINE REFRACTIVE STATE: CPT

## 2024-09-19 PROCEDURE — 92012 INTRM OPH EXAM EST PATIENT: CPT

## 2024-09-19 PROCEDURE — 92133 CPTRZD OPH DX IMG PST SGM ON: CPT

## 2024-09-19 RX ORDER — TRAVOPROST 0.04 MG/ML: 1 SOLUTION/ DROPS OPHTHALMIC EVERY EVENING

## 2024-12-05 ENCOUNTER — FOLLOW UP (OUTPATIENT)
Age: 79
End: 2024-12-05

## 2024-12-05 DIAGNOSIS — H40.1122: ICD-10-CM

## 2024-12-05 DIAGNOSIS — H40.1113: ICD-10-CM

## 2024-12-05 DIAGNOSIS — H04.123: ICD-10-CM

## 2024-12-05 PROCEDURE — 92012 INTRM OPH EXAM EST PATIENT: CPT

## 2025-03-05 ENCOUNTER — FOLLOW UP (OUTPATIENT)
Age: 80
End: 2025-03-05

## 2025-03-05 DIAGNOSIS — H04.123: ICD-10-CM

## 2025-03-05 DIAGNOSIS — H40.1122: ICD-10-CM

## 2025-03-05 DIAGNOSIS — H40.1113: ICD-10-CM

## 2025-03-05 PROCEDURE — 92012 INTRM OPH EXAM EST PATIENT: CPT

## 2025-03-05 PROCEDURE — 92083 EXTENDED VISUAL FIELD XM: CPT

## 2025-07-22 ENCOUNTER — APPOINTMENT (OUTPATIENT)
Dept: URBAN - METROPOLITAN AREA CLINIC 137 | Facility: CLINIC | Age: 80
Setting detail: DERMATOLOGY
End: 2025-07-22

## 2025-07-22 DIAGNOSIS — D18.0 HEMANGIOMA: ICD-10-CM

## 2025-07-22 DIAGNOSIS — L57.8 OTHER SKIN CHANGES DUE TO CHRONIC EXPOSURE TO NONIONIZING RADIATION: ICD-10-CM

## 2025-07-22 DIAGNOSIS — L91.8 OTHER HYPERTROPHIC DISORDERS OF THE SKIN: ICD-10-CM | Status: UNCHANGED

## 2025-07-22 DIAGNOSIS — L20.89 OTHER ATOPIC DERMATITIS: ICD-10-CM | Status: INADEQUATELY CONTROLLED

## 2025-07-22 DIAGNOSIS — L82.0 INFLAMED SEBORRHEIC KERATOSIS: ICD-10-CM | Status: INADEQUATELY CONTROLLED

## 2025-07-22 DIAGNOSIS — L81.4 OTHER MELANIN HYPERPIGMENTATION: ICD-10-CM

## 2025-07-22 DIAGNOSIS — T07XXXA INSECT BITE, NONVENOMOUS, OF OTHER, MULTIPLE, AND UNSPECIFIED SITES, WITHOUT MENTION OF INFECTION: ICD-10-CM

## 2025-07-22 DIAGNOSIS — Z71.89 OTHER SPECIFIED COUNSELING: ICD-10-CM

## 2025-07-22 DIAGNOSIS — L82.1 OTHER SEBORRHEIC KERATOSIS: ICD-10-CM

## 2025-07-22 PROBLEM — D18.01 HEMANGIOMA OF SKIN AND SUBCUTANEOUS TISSUE: Status: ACTIVE | Noted: 2025-07-22

## 2025-07-22 PROBLEM — S80.862A INSECT BITE (NONVENOMOUS), LEFT LOWER LEG, INITIAL ENCOUNTER: Status: ACTIVE | Noted: 2025-07-22

## 2025-07-22 PROCEDURE — ? SUNSCREEN RECOMMENDATIONS

## 2025-07-22 PROCEDURE — ?: Mod: 25

## 2025-07-22 PROCEDURE — ? COUNSELING

## 2025-07-22 PROCEDURE — ?

## 2025-07-22 PROCEDURE — ? POINTED OUT BY PATIENT

## 2025-07-22 PROCEDURE — ? PRESCRIPTION

## 2025-07-22 PROCEDURE — ? PHOTO-DOCUMENTATION

## 2025-07-22 PROCEDURE — ? LIQUID NITROGEN

## 2025-07-22 PROCEDURE — ? PRESCRIPTION MEDICATION MANAGEMENT

## 2025-07-22 RX ORDER — HYDROCORTISONE 25 MG/G
1 CREAM TOPICAL BID
Qty: 20 | Refills: 1 | Status: ERX | COMMUNITY
Start: 2025-07-22

## 2025-07-22 RX ADMIN — HYDROCORTISONE 1: 25 CREAM TOPICAL at 00:00

## 2025-07-22 ASSESSMENT — LOCATION DETAILED DESCRIPTION DERM
LOCATION DETAILED: UPPER STERNUM
LOCATION DETAILED: LEFT LATERAL ABDOMEN
LOCATION DETAILED: RIGHT MEDIAL SUPERIOR EYELID
LOCATION DETAILED: EPIGASTRIC SKIN
LOCATION DETAILED: LEFT DISTAL CALF
LOCATION DETAILED: RIGHT DISTAL PRETIBIAL REGION
LOCATION DETAILED: RIGHT ANTERIOR DISTAL UPPER ARM
LOCATION DETAILED: LEFT ANTERIOR LATERAL DISTAL THIGH
LOCATION DETAILED: LEFT PROXIMAL CALF
LOCATION DETAILED: RIGHT INFERIOR MEDIAL UPPER BACK

## 2025-07-22 ASSESSMENT — SEVERITY ASSESSMENT 2020: SEVERITY 2020: ALMOST CLEAR

## 2025-07-22 ASSESSMENT — ITCH NUMERIC RATING SCALE: HOW SEVERE IS YOUR ITCHING?: 2

## 2025-07-22 ASSESSMENT — LOCATION ZONE DERM
LOCATION ZONE: TRUNK
LOCATION ZONE: ARM
LOCATION ZONE: LEG
LOCATION ZONE: EYELID

## 2025-07-22 ASSESSMENT — LOCATION SIMPLE DESCRIPTION DERM
LOCATION SIMPLE: RIGHT SUPERIOR EYELID
LOCATION SIMPLE: RIGHT PRETIBIAL REGION
LOCATION SIMPLE: LEFT CALF
LOCATION SIMPLE: RIGHT UPPER ARM
LOCATION SIMPLE: ABDOMEN
LOCATION SIMPLE: LEFT THIGH
LOCATION SIMPLE: CHEST
LOCATION SIMPLE: RIGHT UPPER BACK

## 2025-07-22 ASSESSMENT — BSA ECZEMA: % BODY COVERED IN ECZEMA: 1

## 2025-07-22 NOTE — PROCEDURE: LIQUID NITROGEN
Post-Care Instructions: I reviewed with the patient in detail post-care instructions. Patient is to wear sunprotection, and avoid picking at any of the treated lesions. Pt may apply Vaseline to crusted or scabbing areas.
Detail Level: Detailed
Show Applicator Variable?: Yes
Duration Of Freeze Thaw-Cycle (Seconds): 2
Spray Paint Text: The liquid nitrogen was applied to the skin utilizing a spray paint frosting technique.
Medical Necessity Information: It is in your best interest to select a reason for this procedure from the list below. All of these items fulfill various CMS LCD requirements except the new and changing color options.
Add 52 Modifier (Optional): no
Consent: The patient's consent was obtained including but not limited to risks of crusting, scabbing, blistering, scarring, darker or lighter pigmentary change, recurrence, incomplete removal and infection.
Medical Necessity Clause: This procedure was medically necessary because the lesions that were treated were:
Number Of Freeze-Thaw Cycles: 2 freeze-thaw cycles

## 2025-07-22 NOTE — HPI: SKIN LESION
What Type Of Note Output Would You Prefer (Optional)?: Bullet Format
Is This A New Presentation, Or A Follow-Up?: Skin Lesions
Additional History: Pt states is not sure if they were bites but they are sore.